# Patient Record
Sex: MALE | Race: WHITE | NOT HISPANIC OR LATINO
[De-identification: names, ages, dates, MRNs, and addresses within clinical notes are randomized per-mention and may not be internally consistent; named-entity substitution may affect disease eponyms.]

---

## 2019-02-22 ENCOUNTER — DIAGNOSTIC TRANS (OUTPATIENT)
Dept: OTHER | Age: 19
End: 2019-02-22

## 2019-02-23 ENCOUNTER — HOSPITAL (OUTPATIENT)
Dept: OTHER | Age: 19
End: 2019-02-23

## 2019-02-23 ENCOUNTER — HOSPITAL (OUTPATIENT)
Dept: OTHER | Age: 19
End: 2019-02-23
Attending: INTERNAL MEDICINE

## 2019-02-23 LAB
ALBUMIN SERPL-MCNC: 3.7 GM/DL (ref 3.6–5.1)
ALBUMIN/GLOB SERPL: 1.5 {RATIO} (ref 1–2.4)
ALP SERPL-CCNC: 68 UNIT/L (ref 45–117)
ALT SERPL-CCNC: 73 UNIT/L
ANALYZER ANC (IANC): NORMAL
ANALYZER ANC (IANC): NORMAL
ANION GAP SERPL CALC-SCNC: 17 MMOL/L (ref 10–20)
ANION GAP SERPL CALC-SCNC: 19 MMOL/L (ref 10–20)
AST SERPL-CCNC: 83 UNIT/L
BASOPHILS # BLD: 0 THOUSAND/MCL (ref 0–0.3)
BASOPHILS # BLD: 0 THOUSAND/MCL (ref 0–0.3)
BASOPHILS NFR BLD: 0 %
BASOPHILS NFR BLD: 0 %
BILIRUB SERPL-MCNC: 0.6 MG/DL (ref 0.2–1)
BUN SERPL-MCNC: 10 MG/DL (ref 6–20)
BUN SERPL-MCNC: 12 MG/DL (ref 6–20)
BUN/CREAT SERPL: 13 (ref 7–25)
BUN/CREAT SERPL: 15 (ref 7–25)
CALCIUM SERPL-MCNC: 8.7 MG/DL (ref 8.4–10.2)
CALCIUM SERPL-MCNC: 9.4 MG/DL (ref 8.4–10.2)
CHLORIDE: 102 MMOL/L (ref 98–107)
CHLORIDE: 110 MMOL/L (ref 98–107)
CK SERPL-CCNC: 1363 UNIT/L (ref 39–308)
CK SERPL-CCNC: 2742 UNIT/L (ref 39–308)
CO2 SERPL-SCNC: 21 MMOL/L (ref 21–32)
CO2 SERPL-SCNC: 22 MMOL/L (ref 21–32)
CREAT SERPL-MCNC: 0.78 MG/DL (ref 0.67–1.17)
CREAT SERPL-MCNC: 0.79 MG/DL (ref 0.67–1.17)
D DIMER PPP FEU-MCNC: <0.19 MG/L FEU
DIFFERENTIAL METHOD BLD: NORMAL
DIFFERENTIAL METHOD BLD: NORMAL
EOSINOPHIL # BLD: 0.1 THOUSAND/MCL (ref 0.1–0.5)
EOSINOPHIL # BLD: 0.2 THOUSAND/MCL (ref 0.1–0.5)
EOSINOPHIL NFR BLD: 1 %
EOSINOPHIL NFR BLD: 2 %
ERYTHROCYTE [DISTWIDTH] IN BLOOD: 12.2 % (ref 11–15)
ERYTHROCYTE [DISTWIDTH] IN BLOOD: 12.6 % (ref 11–15)
GLOBULIN SER-MCNC: 2.5 GM/DL (ref 2–4)
GLUCOSE SERPL-MCNC: 112 MG/DL (ref 65–99)
GLUCOSE SERPL-MCNC: 92 MG/DL (ref 65–99)
GLYCOHEMOGLOBIN: 5.2 % (ref 4.5–5.6)
HEMATOCRIT: 41.1 % (ref 39–51)
HEMATOCRIT: 44.3 % (ref 39–51)
HGB BLD-MCNC: 14 GM/DL (ref 13–17)
HGB BLD-MCNC: 15.5 GM/DL (ref 13–17)
IMM GRANULOCYTES # BLD AUTO: 0 THOUSAND/MCL (ref 0–0.2)
IMM GRANULOCYTES # BLD AUTO: 0 THOUSAND/MCL (ref 0–0.2)
IMM GRANULOCYTES NFR BLD: 0 %
IMM GRANULOCYTES NFR BLD: 1 %
LYMPHOCYTES # BLD: 1.8 THOUSAND/MCL (ref 1.2–5.2)
LYMPHOCYTES # BLD: 2.4 THOUSAND/MCL (ref 1.2–5.2)
LYMPHOCYTES NFR BLD: 21 %
LYMPHOCYTES NFR BLD: 32 %
MAGNESIUM SERPL-MCNC: 1.6 MG/DL (ref 1.7–2.4)
MAGNESIUM SERPL-MCNC: 2 MG/DL (ref 1.7–2.4)
MCH RBC QN AUTO: 28.4 PG (ref 26–34)
MCH RBC QN AUTO: 28.5 PG (ref 26–34)
MCHC RBC AUTO-ENTMCNC: 34.1 GM/DL (ref 32–36.5)
MCHC RBC AUTO-ENTMCNC: 35 GM/DL (ref 32–36.5)
MCV RBC AUTO: 81.1 FL (ref 78–100)
MCV RBC AUTO: 83.5 FL (ref 78–100)
MONOCYTES # BLD: 0.5 THOUSAND/MCL (ref 0.3–0.9)
MONOCYTES # BLD: 0.6 THOUSAND/MCL (ref 0.3–0.9)
MONOCYTES NFR BLD: 6 %
MONOCYTES NFR BLD: 8 %
NEUTROPHILS # BLD: 4.2 THOUSAND/MCL (ref 1.8–8)
NEUTROPHILS # BLD: 6.1 THOUSAND/MCL (ref 1.8–8)
NEUTROPHILS NFR BLD: 58 %
NEUTROPHILS NFR BLD: 71 %
NEUTS SEG NFR BLD: NORMAL %
NEUTS SEG NFR BLD: NORMAL %
NRBC (NRBCRE): 0 /100 WBC
NRBC (NRBCRE): 0 /100 WBC
PLATELET # BLD: 220 THOUSAND/MCL (ref 140–450)
PLATELET # BLD: 271 THOUSAND/MCL (ref 140–450)
POTASSIUM SERPL-SCNC: 3.2 MMOL/L (ref 3.4–5.1)
POTASSIUM SERPL-SCNC: 4.3 MMOL/L (ref 3.4–5.1)
PROT SERPL-MCNC: 6.2 GM/DL (ref 6.4–8.2)
RBC # BLD: 4.92 MILLION/MCL (ref 4.5–5.9)
RBC # BLD: 5.46 MILLION/MCL (ref 4.5–5.9)
SODIUM SERPL-SCNC: 139 MMOL/L (ref 135–145)
SODIUM SERPL-SCNC: 145 MMOL/L (ref 135–145)
TROPONIN I SERPL HS-MCNC: <0.02 NG/ML
TSH SERPL-ACNC: 2.55 MCUNIT/ML (ref 0.46–4.13)
WBC # BLD: 7.3 THOUSAND/MCL (ref 4.2–11)
WBC # BLD: 8.5 THOUSAND/MCL (ref 4.2–11)

## 2024-03-15 PROBLEM — F90.0 ATTENTION DEFICIT HYPERACTIVITY DISORDER (ADHD), PREDOMINANTLY INATTENTIVE TYPE: Status: ACTIVE | Noted: 2022-03-31

## 2024-03-15 PROBLEM — H91.93 BILATERAL HEARING LOSS: Status: ACTIVE | Noted: 2022-03-31

## 2024-03-15 PROBLEM — R07.89 OTHER CHEST PAIN: Status: ACTIVE | Noted: 2022-08-22

## 2024-03-15 PROBLEM — F41.9 ANXIETY: Status: ACTIVE | Noted: 2022-03-31

## 2024-06-20 ENCOUNTER — OFFICE VISIT (OUTPATIENT)
Dept: OCCUPATIONAL MEDICINE | Age: 24
End: 2024-06-20
Attending: PHYSICIAN ASSISTANT

## 2024-06-20 DIAGNOSIS — Z02.89 VISIT FOR OCCUPATIONAL HEALTH EXAMINATION: Primary | ICD-10-CM

## 2024-06-20 LAB
HBV SURFACE AB SER QL: NONREACTIVE
HBV SURFACE AB SERPL IA-ACNC: 4.78 MIU/ML
RUBV IGG SER QL: POSITIVE
RUBV IGG SER-ACNC: 107.8 IU/ML (ref 10–?)

## 2024-06-20 PROCEDURE — 86706 HEP B SURFACE ANTIBODY: CPT

## 2024-06-20 PROCEDURE — 86765 RUBEOLA ANTIBODY: CPT

## 2024-06-20 PROCEDURE — 86762 RUBELLA ANTIBODY: CPT

## 2024-06-20 PROCEDURE — 86480 TB TEST CELL IMMUN MEASURE: CPT

## 2024-06-20 PROCEDURE — 86735 MUMPS ANTIBODY: CPT

## 2024-06-20 PROCEDURE — 86787 VARICELLA-ZOSTER ANTIBODY: CPT

## 2024-06-21 LAB
MEV IGG SER-ACNC: >300 AU/ML (ref 16.5–?)
MUV IGG SER IA-ACNC: 204 AU/ML (ref 11–?)
VZV IGG SER IA-ACNC: 790.1 (ref 165–?)

## 2024-06-23 LAB
M TB IFN-G CD4+ T-CELLS BLD-ACNC: 0.02 IU/ML
M TB TUBERC IFN-G BLD QL: NEGATIVE
M TB TUBERC IGNF/MITOGEN IGNF CONTROL: >10 IU/ML
QFT TB1 AG MINUS NIL: 0.02 IU/ML
QFT TB2 AG MINUS NIL: 0.01 IU/ML

## 2024-06-24 ENCOUNTER — OFFICE VISIT (OUTPATIENT)
Dept: OCCUPATIONAL MEDICINE | Age: 24
End: 2024-06-24
Attending: PHYSICIAN ASSISTANT

## 2024-08-18 ENCOUNTER — HOSPITAL ENCOUNTER (OUTPATIENT)
Dept: CT IMAGING | Age: 24
Discharge: HOME | End: 2024-08-18
Attending: INTERNAL MEDICINE

## 2024-08-18 DIAGNOSIS — R07.2 PRECORDIAL PAIN: ICD-10-CM

## 2025-02-11 ENCOUNTER — OFFICE VISIT (OUTPATIENT)
Dept: OCCUPATIONAL MEDICINE | Age: 25
End: 2025-02-11
Attending: FAMILY MEDICINE

## 2025-02-11 DIAGNOSIS — Z02.89 VISIT FOR OCCUPATIONAL HEALTH EXAMINATION: Primary | ICD-10-CM

## 2025-02-11 PROCEDURE — 86480 TB TEST CELL IMMUN MEASURE: CPT

## 2025-02-13 LAB
M TB IFN-G CD4+ T-CELLS BLD-ACNC: 0.02 IU/ML
M TB TUBERC IFN-G BLD QL: NEGATIVE
M TB TUBERC IGNF/MITOGEN IGNF CONTROL: >10 IU/ML
QFT TB1 AG MINUS NIL: -0.01 IU/ML
QFT TB2 AG MINUS NIL: -0.01 IU/ML

## 2025-04-23 NOTE — PROGRESS NOTES
HPI:   Raul Brooke is a 24 year old male who presents for a complete physical exam.     Patient presents with:  Establish Care/annual physical  Patient requests referral for ADHD, patient states he has had testing and been diagnosed with this in the past, he has been on stimulant medication in the past but not for a while, he would like to be treated for this again.  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    I reviewed his's Past Medical History, Past Surgical History, Family and Social History    Labs:   Lab Results   Component Value Date/Time    WBC 6.5 03/15/2024 10:33 AM    HGB 13.9 03/15/2024 10:33 AM     03/15/2024 10:33 AM      Lab Results   Component Value Date/Time    GLU 99 03/15/2024 10:33 AM     03/15/2024 10:33 AM    K 4.0 03/15/2024 10:33 AM     03/15/2024 10:33 AM    CO2 24 03/15/2024 10:33 AM    CREATSERUM 1.04 03/15/2024 10:33 AM    CA 9.3 03/15/2024 10:33 AM    ALB 4.5 03/15/2024 10:33 AM    TP 6.3 03/15/2024 10:33 AM    ALKPHO 54 03/15/2024 10:33 AM    AST 24 03/15/2024 10:33 AM    ALT 23 03/15/2024 10:33 AM    BILT 0.3 03/15/2024 10:33 AM    TSH 1.48 03/15/2024 10:33 AM    T4F 1.3 03/15/2024 02:45 PM        Lab Results   Component Value Date/Time    CHOLEST 138 03/15/2024 10:33 AM    HDL 45 03/15/2024 10:33 AM    TRIG 143 03/15/2024 10:33 AM    LDL 70 03/15/2024 10:33 AM    NONHDLC 93 03/15/2024 10:33 AM           Current Medications[1]   Past Medical History[2]   Past Surgical History[3]   Family History[4]  Allergies[5]   Social History:  Social Hx on file[6]      REVIEW OF SYSTEMS:   Review of Systems   Constitutional: Negative.    HENT:  Positive for hearing loss.    Eyes: Negative.    Respiratory: Negative.     Cardiovascular: Negative.    Gastrointestinal: Negative.    Genitourinary: Negative.    Musculoskeletal: Negative.     Skin: Negative.    Neurological: Negative.    Psychiatric/Behavioral:  Positive for decreased concentration. Negative for behavioral problems, confusion, dysphoric mood, self-injury and suicidal ideas. The patient is hyperactive. The patient is not nervous/anxious.        EXAM:   /81   Pulse 84   Ht 5' 10\" (1.778 m)   Wt 159 lb (72.1 kg)   BMI 22.81 kg/m²  Body mass index is 22.81 kg/m².  Physical Exam  Vitals and nursing note reviewed.   Constitutional:       General: He is awake. He is not in acute distress.     Appearance: Normal appearance. He is well-developed and normal weight. He is not ill-appearing or toxic-appearing.   HENT:      Head: Normocephalic and atraumatic.      Right Ear: Tympanic membrane, ear canal and external ear normal. There is no impacted cerumen.      Left Ear: Tympanic membrane, ear canal and external ear normal. There is no impacted cerumen.      Ears:      Comments: RT hearing aid (Cochlear implant on RT)     Nose: Nose normal. No congestion.      Mouth/Throat:      Mouth: Mucous membranes are moist.      Pharynx: Oropharynx is clear. No oropharyngeal exudate or posterior oropharyngeal erythema.   Eyes:      General: Vision grossly intact. No scleral icterus.     Extraocular Movements: Extraocular movements intact.      Conjunctiva/sclera: Conjunctivae normal.      Pupils: Pupils are equal, round, and reactive to light.   Neck:      Thyroid: No thyroid mass, thyromegaly or thyroid tenderness.      Vascular: No carotid bruit, hepatojugular reflux or JVD.      Trachea: Trachea and phonation normal.   Cardiovascular:      Rate and Rhythm: Normal rate and regular rhythm.      Pulses: Normal pulses.           Posterior tibial pulses are 2+ on the right side and 2+ on the left side.      Heart sounds: Normal heart sounds, S1 normal and S2 normal. No murmur heard.     No S3 or S4 sounds.   Pulmonary:      Effort: Pulmonary effort is normal. No respiratory distress.      Breath  sounds: Normal breath sounds and air entry. No wheezing, rhonchi or rales.   Abdominal:      General: Bowel sounds are normal. There is no distension.      Palpations: Abdomen is soft. There is no hepatomegaly, splenomegaly or mass.      Tenderness: There is no abdominal tenderness. There is no guarding or rebound.      Hernia: No hernia is present.   Musculoskeletal:         General: No swelling, deformity or signs of injury. Normal range of motion.      Cervical back: Normal range of motion and neck supple. No rigidity or tenderness.      Right lower leg: No edema.      Left lower leg: No edema.   Lymphadenopathy:      Cervical: No cervical adenopathy.      Right cervical: No superficial cervical adenopathy.     Left cervical: No superficial cervical adenopathy.      Upper Body:      Right upper body: No supraclavicular adenopathy.      Left upper body: No supraclavicular adenopathy.   Skin:     General: Skin is warm and dry.      Capillary Refill: Capillary refill takes less than 2 seconds.      Coloration: Skin is not cyanotic, jaundiced or pale.      Nails: There is no clubbing.   Neurological:      General: No focal deficit present.      Mental Status: He is alert and oriented to person, place, and time.      Cranial Nerves: Cranial nerves 2-12 are intact. No cranial nerve deficit, dysarthria or facial asymmetry.      Sensory: No sensory deficit.      Motor: Motor function is intact.      Coordination: Coordination is intact.      Deep Tendon Reflexes: Reflexes are normal and symmetric. Reflexes normal.   Psychiatric:         Attention and Perception: Attention and perception normal. He is attentive. He does not perceive visual hallucinations.         Mood and Affect: Mood and affect normal. Mood is not anxious or depressed. Affect is not labile, blunt, flat or inappropriate.         Speech: Speech normal.         Behavior: Behavior normal. Behavior is cooperative.         Thought Content: Thought content  normal. Thought content is not paranoid or delusional. Thought content does not include homicidal or suicidal ideation. Thought content does not include homicidal or suicidal plan.         Cognition and Memory: Cognition and memory normal. Cognition is not impaired. Memory is not impaired. He does not exhibit impaired recent memory or impaired remote memory.         Judgment: Judgment normal.         ASSESSMENT AND PLAN:   1. Raul Brooke is a 24 year old male who presents for a complete physical exam.    Patient is in good health.  Stress importance of exercise and healthy well balanced diet. Recommend low fat diet and aerobic exercise 30+ minutes 4-5 times weekly, as tolerated    Health maintenance:  Fasting Lipids, CMP, and CBC: due 7/2025   Immunizations: Patient nonimmune to hepatitis B at his evaluation last year, will restart series today, follow-up in 1 and 6 months for completion of the series, recheck hepatitis B titers 6 weeks after completing the series if required by his employer  We will follow-up pending results  The patient is asked to return for CPE in 1-2 years.    Patient also here for:  Medical problem(s)/complaints as below    1. Routine medical exam  - CBC With Differential With Platelet  - Comp Metabolic Panel (14)  - Lipid Panel    2. Immunization due  - HEPATITIS B VACCINE, OVER 20    3. Abnormal EKG  Reviewed EKG from 2/10/2024:  Sinus Rhythm  Left-precordial ST elevation, consider acute ischemia   Recommend cardiology evaluation and treatment now  Discussed with patient that he should not start any stimulant medications until he has been given cardiac clearance by the cardiologist and to defer his appointment with the psychiatrist until after complete    - CARDIO - EXTERNAL    4. Attention deficit hyperactivity disorder (ADHD), predominantly inattentive type  Referred to [facility]-nurse practitioner, see further recommendations above  - [facility]    5. Elevated antinuclear antibody (SHAHRIAR) level  Mild, 1:80 7/12/2024, recheck with next labs, follow-up pending results  - Connective Tissue Disease (SHAHRIAR) Screen, Reflex Specific Antibody; Future    6. Sensorineural hearing loss, bilateral  With cochlear implant  No hearing deficits on today's exam  Follow-up as below      Patient (and/or guardian or parent if less than 18 years old) was given instructions regarding diagnosis, management, expectations and follow up.    Patient Instructions   Will contact you/patient when the test(s):   LAB (due July 2025) result(s) are final and with further recommendations then if any changes.    Recommend:    Follow-up for your hepatitis B vaccine series, you will need 2 more vaccines, the second in 1 month and the 3rd in 6 months. A nurse appointment is okay or you can get at your pharmacy.    Follow-up:  Pending the test results    With the cardiologist for your abnormal EKG as soon as an appointment is available.  After you are cleared by the cardiologist then see the behavioral health/psych nurse practitioner for ADHD follow-up/treatment.      Go to the emergency room or call 911 for any new or suddenly worsening symptoms, any signs of acute distress, respiratory distress or emergent changes.      Orders Placed This Encounter   Procedures    CBC With Differential With Platelet    Comp Metabolic Panel (14)    Connective Tissue Disease (SHAHRIAR) Screen, Reflex Specific Antibody    Lipid Panel    HEPATITIS B VACCINE, OVER 20     Meds & Refills for this Visit:  Requested Prescriptions      No prescriptions requested or ordered in this encounter     Other Orders, Imaging & Consults:  HEPATITIS B VACCINE, OVER 20  CARDIO - EXTERNAL  [facility] - INTERNAL    Return in about 1 year (around 4/24/2026) for CPE.  Follow up: as above (See AVS)    All questions were answered.  We discussed the indications, proper use, risks, and benefits of the above  recommendations including any medication(s) as prescribed.  The patient (and/or guardian or parent if less than 18 years old) indicate(s) understanding and agree(s) to the above plan of care.    Belinda Amin MD         [1]   Current Outpatient Medications   Medication Sig Dispense Refill    dutasteride 0.5 MG Oral Cap Take 1 capsule (0.5 mg total) by mouth daily. (Patient not taking: Reported on 4/24/2025) 30 capsule 2   [2]   Past Medical History:   Bilateral hearing loss   [3] History reviewed. No pertinent surgical history.  [4] History reviewed. No pertinent family history.  [5]   Allergies  Allergen Reactions    Cortisporin [Antibiotic Ear] RASH   [6]   Social History  Socioeconomic History    Marital status: Single   Tobacco Use    Smoking status: Some Days     Types: Cigarettes, Cigars     Passive exposure: Never    Smokeless tobacco: Never   Vaping Use    Vaping status: Never Used   Substance and Sexual Activity    Alcohol use: Yes     Comment: occasionally    Drug use: Never

## 2025-04-24 ENCOUNTER — OFFICE VISIT (OUTPATIENT)
Dept: FAMILY MEDICINE CLINIC | Facility: CLINIC | Age: 25
End: 2025-04-24

## 2025-04-24 VITALS
BODY MASS INDEX: 22.76 KG/M2 | WEIGHT: 159 LBS | HEART RATE: 84 BPM | HEIGHT: 70 IN | DIASTOLIC BLOOD PRESSURE: 81 MMHG | SYSTOLIC BLOOD PRESSURE: 119 MMHG

## 2025-04-24 DIAGNOSIS — R76.8 ELEVATED ANTINUCLEAR ANTIBODY (ANA) LEVEL: ICD-10-CM

## 2025-04-24 DIAGNOSIS — R94.31 ABNORMAL EKG: ICD-10-CM

## 2025-04-24 DIAGNOSIS — Z00.00 ROUTINE MEDICAL EXAM: Primary | ICD-10-CM

## 2025-04-24 DIAGNOSIS — H90.3 SENSORINEURAL HEARING LOSS, BILATERAL: ICD-10-CM

## 2025-04-24 DIAGNOSIS — Z23 IMMUNIZATION DUE: ICD-10-CM

## 2025-04-24 DIAGNOSIS — F90.0 ATTENTION DEFICIT HYPERACTIVITY DISORDER (ADHD), PREDOMINANTLY INATTENTIVE TYPE: ICD-10-CM

## 2025-04-24 NOTE — PATIENT INSTRUCTIONS
Will contact you/patient when the test(s):   LAB (due July 2025) result(s) are final and with further recommendations then if any changes.    Recommend:    Follow-up for your hepatitis B vaccine series, you will need 2 more vaccines, the second in 1 month and the 3rd in 6 months. A nurse appointment is okay or you can get at your pharmacy.    Follow-up:  Pending the test results    With the cardiologist for your abnormal EKG as soon as an appointment is available.  After you are cleared by the cardiologist then see the behavioral health/psych nurse practitioner for ADHD follow-up/treatment.      Go to the emergency room or call 911 for any new or suddenly worsening symptoms, any signs of acute distress, respiratory distress or emergent changes.

## 2025-04-30 ENCOUNTER — TELEPHONE (OUTPATIENT)
Age: 25
End: 2025-04-30

## 2025-04-30 NOTE — TELEPHONE ENCOUNTER
Hello,    Sorry I missed you - I am reaching out from the [facility] Navigation department, following up on an order from your provider's office to assist in connecting you with resources for care. If you would like to discuss this further, please give us a call back at [phone], or for more immediate assistance you can contact our 24-hour help line at [phone]. We look forward to hearing from you soon.

## 2025-05-05 ENCOUNTER — TELEPHONE (OUTPATIENT)
Dept: FAMILY MEDICINE CLINIC | Facility: CLINIC | Age: 25
End: 2025-05-05

## 2025-05-18 ENCOUNTER — TELEPHONE (OUTPATIENT)
Age: 25
End: 2025-05-18

## 2025-05-18 NOTE — TELEPHONE ENCOUNTER
Hello,     The [facility] Navigation team has attempted to reach you regarding an order from Dr. Amin's office. We are reaching out in order to assist you in coordinating care and resources that may meet your needs. Please give our office a call at [phone]. For more immediate assistance you can contact our 24-hour help line at [phone]. We look forward to hearing from you soon.

## 2025-08-14 ENCOUNTER — OFFICE VISIT (OUTPATIENT)
Dept: OTHER | Facility: HOSPITAL | Age: 25
End: 2025-08-14
Attending: FAMILY MEDICINE

## 2025-08-14 DIAGNOSIS — Z00.00 ROUTINE GENERAL MEDICAL EXAMINATION AT A HEALTH CARE FACILITY: Primary | ICD-10-CM

## 2025-08-14 LAB
HBV SURFACE AB SER QL: REACTIVE
HBV SURFACE AB SERPL IA-ACNC: >1000 MIU/ML

## 2025-08-14 PROCEDURE — 86480 TB TEST CELL IMMUN MEASURE: CPT

## 2025-08-14 PROCEDURE — 86706 HEP B SURFACE ANTIBODY: CPT

## 2025-08-18 LAB
M TB IFN-G CD4+ T-CELLS BLD-ACNC: 0.03 IU/ML
M TB TUBERC IFN-G BLD QL: NEGATIVE
M TB TUBERC IGNF/MITOGEN IGNF CONTROL: >10 IU/ML
QFT TB1 AG MINUS NIL: 0 IU/ML
QFT TB2 AG MINUS NIL: 0 IU/ML